# Patient Record
Sex: MALE | Race: WHITE | Employment: FULL TIME | URBAN - METROPOLITAN AREA
[De-identification: names, ages, dates, MRNs, and addresses within clinical notes are randomized per-mention and may not be internally consistent; named-entity substitution may affect disease eponyms.]

---

## 2021-02-10 ENCOUNTER — OFFICE VISIT (OUTPATIENT)
Dept: URGENT CARE | Facility: URGENT CARE | Age: 31
End: 2021-02-10

## 2021-02-10 ENCOUNTER — ANCILLARY PROCEDURE (OUTPATIENT)
Dept: GENERAL RADIOLOGY | Facility: CLINIC | Age: 31
End: 2021-02-10
Attending: PHYSICIAN ASSISTANT

## 2021-02-10 VITALS
DIASTOLIC BLOOD PRESSURE: 80 MMHG | TEMPERATURE: 98 F | HEART RATE: 98 BPM | RESPIRATION RATE: 20 BRPM | SYSTOLIC BLOOD PRESSURE: 130 MMHG | OXYGEN SATURATION: 98 %

## 2021-02-10 DIAGNOSIS — T14.8XXA PENETRATING INJURY: ICD-10-CM

## 2021-02-10 DIAGNOSIS — S69.92XA INJURY OF FINGER OF LEFT HAND, INITIAL ENCOUNTER: Primary | ICD-10-CM

## 2021-02-10 DIAGNOSIS — Z87.821 HISTORY OF RETAINED FOREIGN BODY FULLY REMOVED: ICD-10-CM

## 2021-02-10 PROCEDURE — 90471 IMMUNIZATION ADMIN: CPT | Performed by: PHYSICIAN ASSISTANT

## 2021-02-10 PROCEDURE — 73140 X-RAY EXAM OF FINGER(S): CPT | Mod: LT | Performed by: RADIOLOGY

## 2021-02-10 PROCEDURE — 96372 THER/PROPH/DIAG INJ SC/IM: CPT | Performed by: PHYSICIAN ASSISTANT

## 2021-02-10 PROCEDURE — 99213 OFFICE O/P EST LOW 20 MIN: CPT | Mod: 25 | Performed by: PHYSICIAN ASSISTANT

## 2021-02-10 PROCEDURE — 10120 INC&RMVL FB SUBQ TISS SMPL: CPT | Mod: 25 | Performed by: PHYSICIAN ASSISTANT

## 2021-02-10 PROCEDURE — 90715 TDAP VACCINE 7 YRS/> IM: CPT | Performed by: PHYSICIAN ASSISTANT

## 2021-02-10 PROCEDURE — 99207 PR DROP WITH A PROCEDURE: CPT | Performed by: PHYSICIAN ASSISTANT

## 2021-02-10 RX ORDER — CEFTRIAXONE SODIUM 1 G
1 VIAL (EA) INJECTION ONCE
Status: COMPLETED | OUTPATIENT
Start: 2021-02-10 | End: 2021-02-10

## 2021-02-10 RX ORDER — HYDROCODONE BITARTRATE AND ACETAMINOPHEN 5; 325 MG/1; MG/1
1 TABLET ORAL EVERY 6 HOURS PRN
Qty: 10 TABLET | Refills: 0 | Status: SHIPPED | OUTPATIENT
Start: 2021-02-10 | End: 2021-02-13

## 2021-02-10 RX ADMIN — Medication 1 G: at 11:11

## 2021-02-10 NOTE — LETTER
[facility]  [address]  [address]  [place] [address]  [phone]        February 10, 2021    REPORT OF WORK ABILITY    PATIENT DATA  Employee Name: Ronnie Coates        : 1990   (Not on file)  Work related injury: YES  Today's date: February 10, 2021  Date of injury: 2/10/2021     PROVIDER EVALUATION: Please fill in as needed.  Please give copy to employee for employer.  1. Diagnosis: penetrating foreign body in finger with 1 mm fragment fracture  2. Treatment: Tdap, IM antibiotics, removal or foreign body, oral antibiotics  3. Medication: Rocephin and Augmentin  NOTE: When ordering a medication, [place] Rules require Work Comp or WC on prescriptions.  4. Return to work date: Keep finger clean dry and covered      RESTRICTIONS: Unlimited unless listed.  Restrictions apply to home and leisure also.  If work within restrictions is not available, the employee is totally disabled.  Provider comments:   Medical Examiner: Ja Worthy PA-C      License or registration:     Next appointment: as needed     CC: Employer, Managed Care Plan/Payor, Patient

## 2021-02-10 NOTE — PROGRESS NOTES
Assessment & Plan     Injury of finger of left hand, initial encounter  Xray of finger positive for foreign body in finger  Digital block performed and foreign body removed  Rocephin given in clinic  Tdap given in clinic  Augmentin for infection    - XR Finger Left G/E 2 Views  - cefTRIAXone (ROCEPHIN) injection 1 g  - amoxicillin-clavulanate (AUGMENTIN) 875-125 MG tablet; Take 1 tablet by mouth 2 times daily for 10 days  - HYDROcodone-acetaminophen (NORCO) 5-325 MG tablet; Take 1 tablet by mouth every 6 hours as needed for severe pain    Penetrating injury  Foreign body in finger, removed after digital block  Rocephin and augmentin given  Vicodin for pain  - TDAP VACCINE (Adacel, Boostrix)  [[id number]]  - cefTRIAXone (ROCEPHIN) injection 1 g  - amoxicillin-clavulanate (AUGMENTIN) 875-125 MG tablet; Take 1 tablet by mouth 2 times daily for 10 days  - HYDROcodone-acetaminophen (NORCO) 5-325 MG tablet; Take 1 tablet by mouth every 6 hours as needed for severe pain    History of retained foreign body fully removed  After removal of foreign body positive for small fragment fracture  Monitor for any signs of infection, if this happens go to the ED  - XR Finger Left G/E 2 Views    PROCEDURE:  Finger was prepped with betadine and alcohol  3 ml digital block performed  Staple was removed from hand  Finger was cleaned and dressed  Patient tolerated procedure well      Ja Worthy PA-C  [facility] Trujillo is a 30 year old who presents for the following health issues     HPI     Left finger injury  Penetrating wound through finger, today    Review of Systems   Constitutional, HEENT, cardiovascular, pulmonary, gi and gu systems are negative, except as otherwise noted.      Objective    /80   Pulse 98   Temp 98  F (36.7  C)   Resp 20   SpO2 98%   There is no height or weight on file to calculate BMI.  Physical Exam   GENERAL: healthy, alert and no distress  MS: Positive  for left finger foreign body  VASC: Capillary refill < 2 seconds  SKIN: Positive for penetrating staple, through the finger  EXT: Full ROM of finger  NEURO: Normal strength and tone, mentation intact and speech normal    Xray - Reviewed and interpreted by me.  Positive for staple in finger, removed after procedure, small fragment injury of bone

## 2021-05-01 ENCOUNTER — HEALTH MAINTENANCE LETTER (OUTPATIENT)
Age: 31
End: 2021-05-01

## 2021-10-11 ENCOUNTER — HEALTH MAINTENANCE LETTER (OUTPATIENT)
Age: 31
End: 2021-10-11

## 2022-05-22 ENCOUNTER — HEALTH MAINTENANCE LETTER (OUTPATIENT)
Age: 32
End: 2022-05-22

## 2022-09-25 ENCOUNTER — HEALTH MAINTENANCE LETTER (OUTPATIENT)
Age: 32
End: 2022-09-25

## 2023-06-04 ENCOUNTER — HEALTH MAINTENANCE LETTER (OUTPATIENT)
Age: 33
End: 2023-06-04